# Patient Record
Sex: FEMALE | Race: WHITE | NOT HISPANIC OR LATINO
[De-identification: names, ages, dates, MRNs, and addresses within clinical notes are randomized per-mention and may not be internally consistent; named-entity substitution may affect disease eponyms.]

---

## 2021-11-03 ENCOUNTER — TRANSCRIPTION ENCOUNTER (OUTPATIENT)
Age: 21
End: 2021-11-03

## 2021-11-04 PROBLEM — Z00.00 ENCOUNTER FOR PREVENTIVE HEALTH EXAMINATION: Status: ACTIVE | Noted: 2021-11-04

## 2021-11-08 ENCOUNTER — APPOINTMENT (OUTPATIENT)
Dept: CT IMAGING | Facility: CLINIC | Age: 21
End: 2021-11-08

## 2022-05-03 ENCOUNTER — APPOINTMENT (OUTPATIENT)
Dept: ORTHOPEDIC SURGERY | Facility: CLINIC | Age: 22
End: 2022-05-03
Payer: COMMERCIAL

## 2022-05-03 VITALS — BODY MASS INDEX: 24.84 KG/M2 | WEIGHT: 135 LBS | HEIGHT: 62 IN

## 2022-05-03 DIAGNOSIS — M25.559 PAIN IN UNSPECIFIED HIP: ICD-10-CM

## 2022-05-03 DIAGNOSIS — M25.551 PAIN IN RIGHT HIP: ICD-10-CM

## 2022-05-03 DIAGNOSIS — M54.50 LOW BACK PAIN, UNSPECIFIED: ICD-10-CM

## 2022-05-03 DIAGNOSIS — M53.3 SACROCOCCYGEAL DISORDERS, NOT ELSEWHERE CLASSIFIED: ICD-10-CM

## 2022-05-03 PROCEDURE — 72100 X-RAY EXAM L-S SPINE 2/3 VWS: CPT

## 2022-05-03 PROCEDURE — 73502 X-RAY EXAM HIP UNI 2-3 VIEWS: CPT | Mod: RT

## 2022-05-03 PROCEDURE — 99204 OFFICE O/P NEW MOD 45 MIN: CPT

## 2022-05-03 NOTE — PHYSICAL EXAM
[de-identified] : Right hip\par \par Constitutional: \par The patient is healthy-appearing and in no apparent distress. \par \par Gait:\par The patient ambulates with a normal gait and no limp.\par \par Cardiovascular System: \par There is capillary refill less than 2 seconds. \par \par Skin: \par There is no skin abnormalities.\par \par Lumbar Spine;\par There is no significance malalignment and no tenderness to the paraspinal musculature.\par \par Right Hips: \par \par Bony Palpation: \par There is no tenderness of the iliac crest.\par There is no tenderness of the ASIS.\par There is no tenderness of the PSIS.\par There is Tenderness of the RIGHT SI joint.\par There is no tenderness of the greater trochanter. \par \par Soft Tissue Palpation: \par There is no tenderness of the hip adductors.\par There is no tenderness of the hip abductors.\par There is no tenderness of the hamstrings. \par There is no tenderness of the piriformis. \par There is tenderness of the hip flexors.\par \par Active Range of Motion: \par There is full range of motion at the hip actively and passively. \par \par Special Tests: \par There is a negative Jeremias's test.\par There is a negative Dm-Elsie test. \par \par Strength: \par There is 5/5 hip flexion, adduction, abduction.  \par \par Psychiatric: \par The patient demonstrates a normal mood and affect and is active and alert. [de-identified] : Given patient's reported history and physical examination, x-ray evaluation ( as listed below ) was ordered and performed to aid in diagnosis and treatment of the patient.\par X-ray right hip.  There is no significant bony / soft tissue abnormality, arthritis, or fracture.\par X-ray lumbar spine.  There is no significant bony / soft tissue abnormality, arthritis, or fracture.\par

## 2022-05-03 NOTE — ASSESSMENT
[FreeTextEntry1] : Discussed at length with patient exam history and imaging as well as treatment options.  Given exam we'll need an MRI of the hip and discuss with patient if no significant MRI findings possible consideration to injection to the right SI joint

## 2022-05-03 NOTE — HISTORY OF PRESENT ILLNESS
[de-identified] : Location: Right hip ( anterior and posterior )\par Duration: 2 weeks\par Context: atraumatic\par Quality: sharp, cramping, stabbing, shooting, achy\par Aggravating factors: walking, lying down, after exercise\par Associated symptoms: Low back pain, radiating pain down the leg\par Conservative treatment: rest, Advil\par Prior studies: States saw her GYN and told no pathology on GYN ultrasound